# Patient Record
Sex: FEMALE | Race: WHITE | NOT HISPANIC OR LATINO | URBAN - METROPOLITAN AREA
[De-identification: names, ages, dates, MRNs, and addresses within clinical notes are randomized per-mention and may not be internally consistent; named-entity substitution may affect disease eponyms.]

---

## 2023-02-28 LAB
ALANINE AMINOTRANSFERASE (SGPT) (U/L) IN SER/PLAS: 31 U/L (ref 7–45)
ALBUMIN (G/DL) IN SER/PLAS: 3.9 G/DL (ref 3.4–5)
ALKALINE PHOSPHATASE (U/L) IN SER/PLAS: 105 U/L (ref 33–110)
ANION GAP IN SER/PLAS: 11 MMOL/L (ref 10–20)
ASPARTATE AMINOTRANSFERASE (SGOT) (U/L) IN SER/PLAS: 19 U/L (ref 9–39)
BASOPHILS (10*3/UL) IN BLOOD BY AUTOMATED COUNT: 0.03 X10E9/L (ref 0–0.1)
BASOPHILS/100 LEUKOCYTES IN BLOOD BY AUTOMATED COUNT: 0.5 % (ref 0–2)
BILIRUBIN TOTAL (MG/DL) IN SER/PLAS: 0.6 MG/DL (ref 0–1.2)
CALCIUM (MG/DL) IN SER/PLAS: 8.9 MG/DL (ref 8.6–10.3)
CARBON DIOXIDE, TOTAL (MMOL/L) IN SER/PLAS: 25 MMOL/L (ref 21–32)
CHLORIDE (MMOL/L) IN SER/PLAS: 105 MMOL/L (ref 98–107)
CHOLESTEROL (MG/DL) IN SER/PLAS: 104 MG/DL (ref 0–199)
CREATININE (MG/DL) IN SER/PLAS: 0.56 MG/DL (ref 0.5–1.05)
EOSINOPHILS (10*3/UL) IN BLOOD BY AUTOMATED COUNT: 0.16 X10E9/L (ref 0–0.7)
EOSINOPHILS/100 LEUKOCYTES IN BLOOD BY AUTOMATED COUNT: 2.7 % (ref 0–6)
ERYTHROCYTE DISTRIBUTION WIDTH (RATIO) BY AUTOMATED COUNT: 12.3 % (ref 11.5–14.5)
ERYTHROCYTE MEAN CORPUSCULAR HEMOGLOBIN CONCENTRATION (G/DL) BY AUTOMATED: 32.6 G/DL (ref 32–36)
ERYTHROCYTE MEAN CORPUSCULAR VOLUME (FL) BY AUTOMATED COUNT: 90 FL (ref 80–100)
ERYTHROCYTES (10*6/UL) IN BLOOD BY AUTOMATED COUNT: 4.18 X10E12/L (ref 4–5.2)
GFR FEMALE: >90 ML/MIN/1.73M2
GLUCOSE (MG/DL) IN SER/PLAS: 79 MG/DL (ref 74–99)
HEMATOCRIT (%) IN BLOOD BY AUTOMATED COUNT: 37.7 % (ref 36–46)
HEMOGLOBIN (G/DL) IN BLOOD: 12.3 G/DL (ref 12–16)
IMMATURE GRANULOCYTES/100 LEUKOCYTES IN BLOOD BY AUTOMATED COUNT: 0.3 % (ref 0–0.9)
LEUKOCYTES (10*3/UL) IN BLOOD BY AUTOMATED COUNT: 6 X10E9/L (ref 4.4–11.3)
LYMPHOCYTES (10*3/UL) IN BLOOD BY AUTOMATED COUNT: 1.62 X10E9/L (ref 1.2–4.8)
LYMPHOCYTES/100 LEUKOCYTES IN BLOOD BY AUTOMATED COUNT: 27.2 % (ref 13–44)
MONOCYTES (10*3/UL) IN BLOOD BY AUTOMATED COUNT: 0.46 X10E9/L (ref 0.1–1)
MONOCYTES/100 LEUKOCYTES IN BLOOD BY AUTOMATED COUNT: 7.7 % (ref 2–10)
NEUTROPHILS (10*3/UL) IN BLOOD BY AUTOMATED COUNT: 3.67 X10E9/L (ref 1.2–7.7)
NEUTROPHILS/100 LEUKOCYTES IN BLOOD BY AUTOMATED COUNT: 61.6 % (ref 40–80)
PLATELETS (10*3/UL) IN BLOOD AUTOMATED COUNT: 198 X10E9/L (ref 150–450)
POTASSIUM (MMOL/L) IN SER/PLAS: 4.1 MMOL/L (ref 3.5–5.3)
PROTEIN TOTAL: 6.5 G/DL (ref 6.4–8.2)
SODIUM (MMOL/L) IN SER/PLAS: 137 MMOL/L (ref 136–145)
THYROTROPIN (MIU/L) IN SER/PLAS BY DETECTION LIMIT <= 0.05 MIU/L: 0.01 MIU/L (ref 0.44–3.98)
THYROXINE (T4) FREE (NG/DL) IN SER/PLAS: 2.84 NG/DL (ref 0.61–1.12)
UREA NITROGEN (MG/DL) IN SER/PLAS: 13 MG/DL (ref 6–23)

## 2023-05-12 ENCOUNTER — OFFICE VISIT (OUTPATIENT)
Dept: PRIMARY CARE | Facility: CLINIC | Age: 43
End: 2023-05-12
Payer: COMMERCIAL

## 2023-05-12 VITALS
TEMPERATURE: 98.2 F | RESPIRATION RATE: 16 BRPM | HEIGHT: 63 IN | HEART RATE: 74 BPM | DIASTOLIC BLOOD PRESSURE: 70 MMHG | SYSTOLIC BLOOD PRESSURE: 122 MMHG | WEIGHT: 172.2 LBS | BODY MASS INDEX: 30.51 KG/M2

## 2023-05-12 DIAGNOSIS — E05.90 HYPERTHYROIDISM: Primary | ICD-10-CM

## 2023-05-12 DIAGNOSIS — E55.9 VITAMIN D DEFICIENCY: ICD-10-CM

## 2023-05-12 PROCEDURE — 99386 PREV VISIT NEW AGE 40-64: CPT | Performed by: NURSE PRACTITIONER

## 2023-05-12 ASSESSMENT — PROMIS GLOBAL HEALTH SCALE
RATE_QUALITY_OF_LIFE: VERY GOOD
EMOTIONAL_PROBLEMS: SOMETIMES
RATE_PHYSICAL_HEALTH: GOOD
RATE_AVERAGE_PAIN: 0
RATE_SOCIAL_SATISFACTION: VERY GOOD
CARRYOUT_SOCIAL_ACTIVITIES: VERY GOOD
RATE_AVERAGE_FATIGUE: MILD
RATE_MENTAL_HEALTH: GOOD
RATE_GENERAL_HEALTH: VERY GOOD
CARRYOUT_PHYSICAL_ACTIVITIES: COMPLETELY

## 2023-05-12 ASSESSMENT — ENCOUNTER SYMPTOMS
DIARRHEA: 0
COUGH: 0
DECREASED CONCENTRATION: 0
SORE THROAT: 0
JOINT SWELLING: 0
SINUS PRESSURE: 0
SHORTNESS OF BREATH: 0
MYALGIAS: 0
CHILLS: 0
CONSTIPATION: 0
RHINORRHEA: 0
FEVER: 0
FATIGUE: 1
EYE DISCHARGE: 0
EYE ITCHING: 0
ADENOPATHY: 0
HEADACHES: 0
DIFFICULTY URINATING: 0
VOMITING: 0
COLOR CHANGE: 0
DRY SKIN: 1
NERVOUS/ANXIOUS: 0
HAIR LOSS: 1
DYSURIA: 0
EYE PAIN: 0
WHEEZING: 0
BACK PAIN: 0
NAUSEA: 0
PALPITATIONS: 0

## 2023-05-12 NOTE — PATIENT INSTRUCTIONS
Patient to continue medications as ordered. Have labs drawn, and we will call with results when available. Follow-up in 1 year, or sooner if needed.

## 2023-05-12 NOTE — PROGRESS NOTES
"Subjective   Patient ID: Ana Bishop is a 42 y.o. female who presents for Thyroid Problem. 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     When she saw endo 10 years ago she was having major anxiety and diarrhea, and he wanted to remove her thyroid. She waited and took meds for 4-5 months and he told her she could stop it. Now her hair has been falling out more often.     Sees GYN for paps and mammograms, UTD on vaccines, labs drawn 2/28/23    Pt here to establish care and discuss thyroid issues, recent labs drawn      Thyroid Problem  Presents for initial visit. Symptoms include dry skin, fatigue and hair loss. Patient reports no anxiety, constipation, diarrhea or palpitations. The symptoms have been stable. Past treatments include levothyroxine. The treatment provided mild relief. Risk factors include family history of hypothyroidism.       Review of Systems   Constitutional:  Positive for fatigue. Negative for chills and fever.   HENT:  Negative for congestion, ear pain, rhinorrhea, sinus pressure and sore throat.    Eyes:  Negative for pain, discharge and itching.   Respiratory:  Negative for cough, shortness of breath and wheezing.    Cardiovascular:  Negative for chest pain and palpitations.   Gastrointestinal:  Negative for constipation, diarrhea, nausea and vomiting.   Genitourinary:  Negative for difficulty urinating and dysuria.   Musculoskeletal:  Negative for back pain, joint swelling and myalgias.   Skin:  Negative for color change.   Neurological:  Negative for headaches.   Hematological:  Negative for adenopathy.   Psychiatric/Behavioral:  Negative for decreased concentration. The patient is not nervous/anxious.        Objective   /70   Pulse 74   Temp 36.8 °C (98.2 °F)   Resp 16   Ht 1.588 m (5' 2.5\")   Wt 78.1 kg (172 lb 3.2 oz)   BMI 30.99 kg/m²     Physical Exam  Constitutional:       General: She is not in acute distress.     Appearance: She is not " ill-appearing.   HENT:      Head: Normocephalic and atraumatic.      Right Ear: Tympanic membrane, ear canal and external ear normal.      Left Ear: Tympanic membrane, ear canal and external ear normal.      Nose: Nose normal.      Mouth/Throat:      Mouth: Mucous membranes are moist.      Pharynx: Oropharynx is clear.   Eyes:      Conjunctiva/sclera: Conjunctivae normal.      Pupils: Pupils are equal, round, and reactive to light.   Cardiovascular:      Rate and Rhythm: Normal rate and regular rhythm.      Pulses: Normal pulses.      Heart sounds: Normal heart sounds.   Pulmonary:      Effort: Pulmonary effort is normal. No respiratory distress.      Breath sounds: Normal breath sounds.   Abdominal:      General: Bowel sounds are normal.      Palpations: Abdomen is soft.      Tenderness: There is no abdominal tenderness.   Musculoskeletal:         General: Normal range of motion.   Skin:     General: Skin is warm and dry.   Neurological:      General: No focal deficit present.      Mental Status: She is alert and oriented to person, place, and time.   Psychiatric:         Mood and Affect: Mood normal.         Behavior: Behavior normal.         Thought Content: Thought content normal.         Judgment: Judgment normal.         Assessment/Plan   Problem List Items Addressed This Visit    None  Visit Diagnoses       Hyperthyroidism    -  Primary    Vitamin D deficiency        Relevant Orders    Vitamin D 25-Hydroxy,Total          Patient Instructions   Patient to continue medications as ordered. Have labs drawn, and we will call with results when available. Follow-up in 1 year, or sooner if needed.

## 2023-07-13 LAB
ALANINE AMINOTRANSFERASE (SGPT) (U/L) IN SER/PLAS: 20 U/L (ref 7–45)
ALBUMIN (G/DL) IN SER/PLAS: 4.3 G/DL (ref 3.4–5)
ALKALINE PHOSPHATASE (U/L) IN SER/PLAS: 147 U/L (ref 33–110)
ANION GAP IN SER/PLAS: 15 MMOL/L (ref 10–20)
ASPARTATE AMINOTRANSFERASE (SGOT) (U/L) IN SER/PLAS: 17 U/L (ref 9–39)
BILIRUBIN TOTAL (MG/DL) IN SER/PLAS: 0.8 MG/DL (ref 0–1.2)
CALCIUM (MG/DL) IN SER/PLAS: 9.6 MG/DL (ref 8.6–10.6)
CARBON DIOXIDE, TOTAL (MMOL/L) IN SER/PLAS: 27 MMOL/L (ref 21–32)
CHLORIDE (MMOL/L) IN SER/PLAS: 103 MMOL/L (ref 98–107)
CREATININE (MG/DL) IN SER/PLAS: 0.67 MG/DL (ref 0.5–1.05)
GFR FEMALE: >90 ML/MIN/1.73M2
GLUCOSE (MG/DL) IN SER/PLAS: 72 MG/DL (ref 74–99)
POTASSIUM (MMOL/L) IN SER/PLAS: 5 MMOL/L (ref 3.5–5.3)
PROTEIN TOTAL: 7.1 G/DL (ref 6.4–8.2)
SODIUM (MMOL/L) IN SER/PLAS: 140 MMOL/L (ref 136–145)
THYROPEROXIDASE AB (IU/ML) IN SER/PLAS: >1000 IU/ML
THYROTROPIN (MIU/L) IN SER/PLAS BY DETECTION LIMIT <= 0.05 MIU/L: 0.22 MIU/L (ref 0.44–3.98)
THYROXINE (T4) FREE (NG/DL) IN SER/PLAS: 1.02 NG/DL (ref 0.78–1.48)
UREA NITROGEN (MG/DL) IN SER/PLAS: 11 MG/DL (ref 6–23)

## 2023-07-14 LAB — TRIIODOTHYRONINE (T3) (NG/DL) IN SER/PLAS: 125 NG/DL (ref 60–200)

## 2023-07-20 LAB — THYROID STIMULATING IMMUNOGLOBULIN: 2.4 TSI INDEX

## 2023-09-08 PROBLEM — R07.89 ATYPICAL CHEST PAIN: Status: ACTIVE | Noted: 2023-09-08

## 2023-09-08 PROBLEM — B00.9 HSV-1 INFECTION: Status: ACTIVE | Noted: 2023-09-08

## 2023-09-08 PROBLEM — J30.2 SEASONAL ALLERGIES: Status: ACTIVE | Noted: 2023-09-08

## 2023-09-08 PROBLEM — E04.9 THYROID ENLARGED: Status: ACTIVE | Noted: 2023-09-08

## 2023-09-08 PROBLEM — T83.9XXA IUD COMPLICATION (CMS-HCC): Status: ACTIVE | Noted: 2023-09-08

## 2023-09-08 PROBLEM — N39.3 STRESS INCONTINENCE: Status: ACTIVE | Noted: 2023-09-08

## 2023-09-08 PROBLEM — M22.41 CHONDROMALACIA OF RIGHT PATELLA: Status: ACTIVE | Noted: 2018-06-07

## 2023-09-08 PROBLEM — E07.9 THYROID DISORDER: Status: ACTIVE | Noted: 2023-09-08

## 2023-09-08 PROBLEM — E05.90 HYPERTHYROIDISM: Status: ACTIVE | Noted: 2023-09-08

## 2023-09-08 RX ORDER — METHIMAZOLE 5 MG/1
0.5 TABLET ORAL DAILY
COMMUNITY

## 2023-10-12 ENCOUNTER — TELEMEDICINE (OUTPATIENT)
Dept: ENDOCRINOLOGY | Facility: CLINIC | Age: 43
End: 2023-10-12
Payer: COMMERCIAL

## 2023-10-12 DIAGNOSIS — E05.90 HYPERTHYROIDISM: Primary | ICD-10-CM

## 2023-10-12 PROCEDURE — 99421 OL DIG E/M SVC 5-10 MIN: CPT | Performed by: INTERNAL MEDICINE

## 2023-10-12 NOTE — PROGRESS NOTES
Patient Discussion/Summary    we will call you with lab results      Provider Impressions    42 yo CF with history of hyperthyroidism and recent abnormal TFt suggestive for hyperthyroidism is here for initail visit was referred by her gynecologist:  1- hyperthyroidism with goiter we will check TFT and TSI and tpo and USG of thyroid no severe hyperthyroid symptoms, we wll add MMI and betablocker based on the albs, she agreed with plans. referral to [facility] as well. Albs showed TSI and tpo positive, no USG or 123 scan done, on Mmi 2.5 mg daily not taking regualrly, ordered labs, has less heavy cycles now,seeing gyn they are every month no acute issues today see endo in 2-3 months agreed with riks of MMI including agranulocytosis and liver fx.  2- History of kidney stones ordered PTH and vitamin D      Chief Complaint  Phone visit   NPV. Hyperthyroidism.      History of Present [place]58 yo CF with history of hyperthyroidism and recent abnormal labs is here for initial visit was referred by her gyn.       after having her son few years back she was having flushing and anxiety and was dx with hyperthyroidism, was started on mmi and tried for few months but was very nauseated and her TFT was normal after that and her physical was showed some abnormal TFt but never started mmi. was having some hair loss. in feb tsh was suppressed and free t4 was elevated.  , cycles are regular.had ablation and cycles are every month.   she had pregnancyx 4 , one miscarriage   no recent acne or hirsutism. some hair loss, no palpitation. slick  ahs vasectomy   discussed MMI and risk of agranulocytosis and liver fx she agreed with both discussed etiologies and indications of hyperthyroidism, check USGas well.   she had kidney stones after pregnancy check PTH and vitamin d as well no new kidney stones seeing urogyn for overactive bladder   no palpitation no weight loss, no diarrhea      Review of Systems    Constitutional: no fever,  no chills, normal appetite, no recent weight gain and no recent weight loss.   Eyes: no eye pain, no double vision and no change in vision.   ENT: no hearing loss, no dental problems, no sore throat, no change in voice and as noted in HPI.   Cardiovascular: no chest pain, no palpitations, no intermittent leg claudication and no lower extremity edema.   Respiratory: no shortness of breath, no wheezing and no cough.   Gastrointestinal: no abdominal pain, no constipation, no nausea, no diarrhea and no vomiting.   Genitourinary: no dysuria, no incontinence and no urinary hesitancy.   Genitourinary: no dysuria, no incontinence and no urinary hesitancy.   Musculoskeletal: no arthralgias, no myalgias, no muscle cramps, no joint swelling, no joint stiffness and no limb pain.   Integumentary: no change in skin color, no skin lesions, no itching, no excessive sweating and no skin wound.   Neurological: no confusion, no convulsions, no dizziness, no fainting, no tremors, no foot pain, no limb weakness and no difficulty walking.   Psychiatric: no anxiety, no depression, no personality change, no emotional problems, no suicidal ideation, no sleep disturbances and no panic attacks.   Hematologic/Lymphatic: no swollen glands in the neck, no tendency for easy bruising and no tendency for easy bleeding.   All other systems have been reviewed and are negative for complaint.      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)   · Hyperthyroidism (242.90) (E05.90)   · IUD complication (996.76) (T83.9XXA)   · Seasonal allergies (477.9) (J30.2)   · Stress incontinence, female (625.6) (N39.3)   · Thyroid disorder (246.9) (E07.9)   · Thyroid enlarged (240.9) (E04.9)   · Visit for screening mammogram (V76.12) (Z12.31)   · Well woman exam with routine gynecological exam (V72.31) (Z01.419)    Surgical History  Problems    ·  History of Ablation    Family History  Mother    · Family history of cardiac disorder (V17.49) (Z82.49)   · Family history of hypertension (V17.49) (Z82.49)   · Family history of osteoporosis (V17.81) (Z82.62)   · Family history of thyroid disease (V18.19) (Z83.49)  Father    · Family history of diabetes mellitus (V18.0) (Z83.3)    Social History  Problems    · Always uses seat belt   · Consumes alcohol (V49.89) (Z78.9)   · Former smoker (V15.82) (Z87.891)   ·     Allergies  Medication    · No Known Drug Allergies  Recorded By: Nallely Fenton; 6/26/2020 8:49:42 AM  NonMedication    · Bee sting  Recorded By: Sophia Yeager; 12/30/2022 10:11:13 AM    Current Meds    Medication Name Instruction   valACYclovir HCl - 500 MG Oral Tablet TAKE 2 TABLET TWICE DAILY by mouth for 10 days     Vitals      Physical Exam    Constitutional   General appearance: Alert and in no acute distress.   Eyes   Conjunctiva and lids: Normal. Pupils were equal in size, round, reactive to light (PERRL) with normal accommodation and extraocular movements intact (EOMI).   Ears, Nose, Mouth, and Throat   Hearing: Normal.     Oropharynx: Normal.    Neck   Neck: No neck mass was observed. Supple.    Thyroid: Not enlarged and there were no palpable thyroid nodules. goiter on exam.   Pulmonary   Respiratory effort: No respiratory distress.    Percussion of chest: Normal.     Palpation of chest: Normal.    Auscultation of lungs: Clear bilateral breath sounds.   Cardiovascular   Palpation of heart: Normal.     Auscultation of heart: Heart rate and rhythm were normal. Normal S1 and S2, no gallops, no murmurs and no pericardial rub.    Carotid pulses: Normal with no bruits.    Abdominal aorta: Normal.    Femoral pulses: Normal.     Pedal pulses: Normal.    Peripheral vascular exam: Normal.     Examination of extremities for edema and/or varicosities: No peripheral edema.   Chest   Breasts: Normal appearance; no nipple discharge.    Palpation of  breasts and axillae: No palpable mass and no axillary lymphadenopathy.   Examination of supraclavicular and dorsocervical areas: Normal. There were no supraclavicular or dorsocervical fat pads.   Abdomen   Abdomen: Normal bowel sounds, soft nontender; no abdominal mass palpated.    Liver and spleen: No hepatosplenomegaly.    Examination for hernias: No hernias.    Stool sample for occult blood: Normal.       Lymphatic   Palpation of lymph nodes in neck: No lymphadenopathy.    Palpation of lymph nodes in other areas: Normal.    Musculoskeletal   Muscle strength/tone: Normal. No proximal muscle weakness was detected.     Gait and station: Normal.    Digits and nails: No clubbing or cyanosis of the fingernails.    Inspection/palpation of joints, bones, and muscles: No joint swelling seen, normal movements of all extremities.    Range of motion: Normal.     Stability: Normal.    Neurologic -   Cranial Nerve Exam: Normal.    Reflexes: Deep tendon reflexes were normal and without delay in the return phase.   Psychiatric   Judgment and insight: Intact.    Orientation to person, place, and time: Alert and oriented x 3.    Mood and affect: Normal.

## 2023-10-13 ENCOUNTER — LAB (OUTPATIENT)
Dept: LAB | Facility: LAB | Age: 43
End: 2023-10-13
Payer: COMMERCIAL

## 2023-10-13 ENCOUNTER — TELEPHONE (OUTPATIENT)
Dept: INTERNAL MEDICINE | Facility: HOSPITAL | Age: 43
End: 2023-10-13

## 2023-10-13 DIAGNOSIS — E55.9 VITAMIN D DEFICIENCY: ICD-10-CM

## 2023-10-13 DIAGNOSIS — E55.9 VITAMIN D DEFICIENCY: Primary | ICD-10-CM

## 2023-10-13 DIAGNOSIS — R79.89 LFT ELEVATION: Primary | ICD-10-CM

## 2023-10-13 DIAGNOSIS — E05.90 HYPERTHYROIDISM: ICD-10-CM

## 2023-10-13 LAB
25(OH)D3 SERPL-MCNC: 18 NG/ML (ref 30–100)
ALBUMIN SERPL BCP-MCNC: 4.1 G/DL (ref 3.4–5)
ALP SERPL-CCNC: 119 U/L (ref 33–110)
ALT SERPL W P-5'-P-CCNC: 13 U/L (ref 7–45)
ANION GAP SERPL CALC-SCNC: 13 MMOL/L (ref 10–20)
AST SERPL W P-5'-P-CCNC: 13 U/L (ref 9–39)
BILIRUB SERPL-MCNC: 0.8 MG/DL (ref 0–1.2)
BUN SERPL-MCNC: 12 MG/DL (ref 6–23)
CALCIUM SERPL-MCNC: 9 MG/DL (ref 8.6–10.3)
CHLORIDE SERPL-SCNC: 105 MMOL/L (ref 98–107)
CO2 SERPL-SCNC: 24 MMOL/L (ref 21–32)
CREAT SERPL-MCNC: 0.82 MG/DL (ref 0.5–1.05)
GFR SERPL CREATININE-BSD FRML MDRD: >90 ML/MIN/1.73M*2
GLUCOSE SERPL-MCNC: 80 MG/DL (ref 74–99)
POTASSIUM SERPL-SCNC: 4.6 MMOL/L (ref 3.5–5.3)
PROLACTIN SERPL-MCNC: 11.9 UG/L (ref 3–20)
PROT SERPL-MCNC: 6.7 G/DL (ref 6.4–8.2)
SODIUM SERPL-SCNC: 137 MMOL/L (ref 136–145)
T3 SERPL-MCNC: 108 NG/DL (ref 60–200)
T4 FREE SERPL-MCNC: 0.88 NG/DL (ref 0.61–1.12)
TSH SERPL-ACNC: 1.62 MIU/L (ref 0.44–3.98)

## 2023-10-13 PROCEDURE — 84443 ASSAY THYROID STIM HORMONE: CPT

## 2023-10-13 PROCEDURE — 82306 VITAMIN D 25 HYDROXY: CPT

## 2023-10-13 PROCEDURE — 84480 ASSAY TRIIODOTHYRONINE (T3): CPT

## 2023-10-13 PROCEDURE — 36415 COLL VENOUS BLD VENIPUNCTURE: CPT

## 2023-10-13 PROCEDURE — 84439 ASSAY OF FREE THYROXINE: CPT

## 2023-10-13 PROCEDURE — 80053 COMPREHEN METABOLIC PANEL: CPT

## 2023-10-13 PROCEDURE — 84146 ASSAY OF PROLACTIN: CPT

## 2023-10-13 RX ORDER — FOLIC ACID/MULTIVIT,IRON,MINER 0.4MG-18MG
1000 TABLET ORAL ONCE
Status: SHIPPED | OUTPATIENT
Start: 2023-10-13

## 2023-10-13 NOTE — TELEPHONE ENCOUNTER
Called her informed results Tft normal taking MMI 2.5 mg 2-4 times a week we asked her to taper it off, we will hold offI 123 scan and also USG for now she will see my colleagues in 2-3 months

## 2023-10-15 RX ORDER — ERGOCALCIFEROL 1.25 MG/1
50000 CAPSULE ORAL
Qty: 12 CAPSULE | Refills: 0 | Status: SHIPPED | OUTPATIENT
Start: 2023-10-15 | End: 2024-01-07

## 2023-10-26 ENCOUNTER — APPOINTMENT (OUTPATIENT)
Dept: RADIOLOGY | Facility: HOSPITAL | Age: 43
End: 2023-10-26
Payer: COMMERCIAL

## 2024-01-03 ENCOUNTER — APPOINTMENT (OUTPATIENT)
Dept: OBSTETRICS AND GYNECOLOGY | Facility: CLINIC | Age: 44
End: 2024-01-03
Payer: COMMERCIAL

## 2024-02-21 ENCOUNTER — APPOINTMENT (OUTPATIENT)
Dept: OBSTETRICS AND GYNECOLOGY | Facility: CLINIC | Age: 44
End: 2024-02-21
Payer: COMMERCIAL

## 2024-03-13 ENCOUNTER — APPOINTMENT (OUTPATIENT)
Dept: OBSTETRICS AND GYNECOLOGY | Facility: CLINIC | Age: 44
End: 2024-03-13
Payer: COMMERCIAL

## 2024-03-20 ENCOUNTER — APPOINTMENT (OUTPATIENT)
Dept: RADIOLOGY | Facility: CLINIC | Age: 44
End: 2024-03-20
Payer: COMMERCIAL

## 2024-03-20 ENCOUNTER — OFFICE VISIT (OUTPATIENT)
Dept: OBSTETRICS AND GYNECOLOGY | Facility: CLINIC | Age: 44
End: 2024-03-20
Payer: COMMERCIAL

## 2024-03-20 VITALS
WEIGHT: 171.38 LBS | DIASTOLIC BLOOD PRESSURE: 70 MMHG | BODY MASS INDEX: 30.37 KG/M2 | HEIGHT: 63 IN | SYSTOLIC BLOOD PRESSURE: 120 MMHG

## 2024-03-20 DIAGNOSIS — Z12.31 SCREENING MAMMOGRAM FOR BREAST CANCER: ICD-10-CM

## 2024-03-20 DIAGNOSIS — Z01.419 VISIT FOR GYNECOLOGIC EXAMINATION: Primary | ICD-10-CM

## 2024-03-20 DIAGNOSIS — B00.9 HSV-1 INFECTION: ICD-10-CM

## 2024-03-20 DIAGNOSIS — Z12.31 BREAST CANCER SCREENING BY MAMMOGRAM: ICD-10-CM

## 2024-03-20 PROCEDURE — 99396 PREV VISIT EST AGE 40-64: CPT | Performed by: ADVANCED PRACTICE MIDWIFE

## 2024-03-20 RX ORDER — VALACYCLOVIR HYDROCHLORIDE 500 MG/1
500 TABLET, FILM COATED ORAL 2 TIMES DAILY
Qty: 6 TABLET | Refills: 0 | Status: SHIPPED | OUTPATIENT
Start: 2024-03-20 | End: 2024-03-23

## 2024-03-20 ASSESSMENT — ENCOUNTER SYMPTOMS
GASTROINTESTINAL NEGATIVE: 0
HEMATOLOGIC/LYMPHATIC NEGATIVE: 0
RESPIRATORY NEGATIVE: 0
MUSCULOSKELETAL NEGATIVE: 0
PSYCHIATRIC NEGATIVE: 0
ALLERGIC/IMMUNOLOGIC NEGATIVE: 0
ENDOCRINE NEGATIVE: 0
CARDIOVASCULAR NEGATIVE: 0
EYES NEGATIVE: 0
NEUROLOGICAL NEGATIVE: 0
CONSTITUTIONAL NEGATIVE: 0

## 2024-03-20 NOTE — PROGRESS NOTES
"Billie Bishop \"Ana\" is a 43 y.o. female who is here for an annual gyn exam.     Concerns for this visit: None    Periods are irregular, lasting  light spotting 3  days. Feels January period came early. S/p ablation. Dysmenorrhea:none. Cyclic symptoms include irritability. No intermenstrual bleeding, spotting, or discharge.  Current contraception: vasectomy  PAP: 2/27/20 NML HPV -  History of abnormal Pap smear: no  Family history of uterine or ovarian cancer: no  Mammogram: 12/15/22 NML   History of abnormal mammogram: no  Family history of breast cancer: no  Medical hx: Hoshimotos not requiring medication    Review of Systems   Unable to perform ROS: Other (Loss of Hair)   All other systems reviewed and are negative.      Objective     Physical Exam  Constitutional:       General: She is not in acute distress.  Cardiovascular:      Rate and Rhythm: Normal rate and regular rhythm.      Heart sounds: Normal heart sounds.   Pulmonary:      Effort: Pulmonary effort is normal.      Breath sounds: Normal breath sounds.   Chest:      Chest wall: No deformity, swelling or tenderness.   Breasts:     Right: Normal.      Left: Normal.   Abdominal:      Palpations: Abdomen is soft. There is no mass.      Tenderness: There is no abdominal tenderness.   Genitourinary:     General: Normal vulva.      Vagina: No vaginal discharge, erythema, tenderness, bleeding or lesions.      Cervix: No cervical motion tenderness, discharge, friability, lesion or cervical bleeding.      Uterus: Normal. Not enlarged, not fixed and not tender.       Adnexa: Right adnexa normal and left adnexa normal.        Right: No mass, tenderness or fullness.          Left: No mass, tenderness or fullness.        Rectum: Normal. No anal fissure or external hemorrhoid.   Lymphadenopathy:      Upper Body:      Right upper body: No supraclavicular or axillary adenopathy.      Left upper body: No supraclavicular or axillary adenopathy. "   Neurological:      Mental Status: She is alert.          Assessment/Plan: 43 y.o. yo woman for annual GYN exam    Maria Guadalupe was seen today for well women visit.  Diagnoses and all orders for this visit:  Breast cancer screening by mammogram       Points of Discussion:    Abnormal bleeding parameters that would prompt a call  Current PAP guidelines, PAP due 2025  Self breast exam encouraged - mammogram screening by current guidelines  Diet and exercise reviewed  Routine follow up with PCP for health maintenance examination encouraged   Follow up results by portal unless otherwise indicated  F/U 1 year or as needed

## 2024-04-02 ENCOUNTER — APPOINTMENT (OUTPATIENT)
Dept: RADIOLOGY | Facility: CLINIC | Age: 44
End: 2024-04-02
Payer: COMMERCIAL

## 2024-04-02 DIAGNOSIS — Z12.31 SCREENING MAMMOGRAM FOR BREAST CANCER: ICD-10-CM

## 2024-05-29 ENCOUNTER — HOSPITAL ENCOUNTER (OUTPATIENT)
Dept: RADIOLOGY | Facility: CLINIC | Age: 44
Discharge: HOME | End: 2024-05-29
Payer: COMMERCIAL

## 2024-05-29 VITALS — HEIGHT: 63 IN | WEIGHT: 170 LBS | BODY MASS INDEX: 30.12 KG/M2

## 2024-05-29 DIAGNOSIS — Z12.31 SCREENING MAMMOGRAM FOR BREAST CANCER: ICD-10-CM

## 2024-05-29 PROCEDURE — 77067 SCR MAMMO BI INCL CAD: CPT | Performed by: RADIOLOGY

## 2024-05-29 PROCEDURE — 77063 BREAST TOMOSYNTHESIS BI: CPT | Performed by: RADIOLOGY

## 2024-05-29 PROCEDURE — 77067 SCR MAMMO BI INCL CAD: CPT

## 2025-03-25 ENCOUNTER — APPOINTMENT (OUTPATIENT)
Facility: CLINIC | Age: 45
End: 2025-03-25
Payer: COMMERCIAL

## 2025-04-01 ENCOUNTER — APPOINTMENT (OUTPATIENT)
Dept: OBSTETRICS AND GYNECOLOGY | Facility: CLINIC | Age: 45
End: 2025-04-01
Payer: COMMERCIAL

## 2025-04-01 VITALS
WEIGHT: 170.2 LBS | SYSTOLIC BLOOD PRESSURE: 121 MMHG | DIASTOLIC BLOOD PRESSURE: 77 MMHG | BODY MASS INDEX: 30.16 KG/M2 | HEIGHT: 63 IN

## 2025-04-01 DIAGNOSIS — Z01.419 VISIT FOR GYNECOLOGIC EXAMINATION: Primary | ICD-10-CM

## 2025-04-01 DIAGNOSIS — F41.9 ANXIETY: ICD-10-CM

## 2025-04-01 PROCEDURE — 99396 PREV VISIT EST AGE 40-64: CPT | Performed by: ADVANCED PRACTICE MIDWIFE

## 2025-04-01 PROCEDURE — 87624 HPV HI-RISK TYP POOLED RSLT: CPT

## 2025-04-01 PROCEDURE — 3008F BODY MASS INDEX DOCD: CPT | Performed by: ADVANCED PRACTICE MIDWIFE

## 2025-04-01 RX ORDER — CITALOPRAM 20 MG/1
20 TABLET, FILM COATED ORAL DAILY
Qty: 30 TABLET | Refills: 2 | Status: SHIPPED | OUTPATIENT
Start: 2025-04-01 | End: 2025-06-30

## 2025-04-01 NOTE — PROGRESS NOTES
"Billie Bishop \"Bernice" is a 44 y.o. female  who is here for a routine annual gynecology exam.  Concerns for this visit: anxiety frequently    S/p ablation   LMP: 2025, becoming heavier and more frequent but then will skip a month  Current contraception: none  Menses: abnormal   Last pap: 2020 nml HPV-  History of abnormal Pap smear: no  Due for pap: yes -    Family history of uterine or ovarian cancer: no  Family history of breast cancer: yes - paternal side  Last mammogram: 2024, orders in     Review of Systems  Patient intake reviewed    Objective   /77 (BP Location: Right arm, Patient Position: Sitting)   Ht 1.588 m (5' 2.5\")   Wt 77.2 kg (170 lb 3.2 oz)   LMP 2025   BMI 30.63 kg/m²   Physical Exam  Constitutional:       General: She is not in acute distress.  Cardiovascular:      Rate and Rhythm: Normal rate and regular rhythm.      Heart sounds: Normal heart sounds.   Pulmonary:      Effort: Pulmonary effort is normal.      Breath sounds: Normal breath sounds.   Chest:      Chest wall: No deformity, swelling or tenderness.   Breasts:     Right: Normal.      Left: Normal.   Abdominal:      Palpations: Abdomen is soft. There is no mass.      Tenderness: There is no abdominal tenderness.   Genitourinary:     General: Normal vulva.      Vagina: No vaginal discharge, erythema, tenderness, bleeding or lesions.      Cervix: Friability present. No cervical motion tenderness, discharge, lesion or cervical bleeding.      Uterus: Normal. Not enlarged, not fixed and not tender.       Adnexa: Right adnexa normal and left adnexa normal.        Right: No mass, tenderness or fullness.          Left: No mass, tenderness or fullness.        Rectum: Normal. No anal fissure or external hemorrhoid.   Lymphadenopathy:      Upper Body:      Right upper body: No supraclavicular or axillary adenopathy.      Left upper body: No supraclavicular or axillary adenopathy. " "  Neurological:      Mental Status: She is alert.          Assessment/Plan: 44 y.o. yo woman for annual GYN exam    Maria Guadalupe \"Ana\" was seen today for annual exam.  Diagnoses and all orders for this visit:  Visit for gynecologic examination  -     THINPREP PAP TEST (>30)  Anxiety  -     citalopram (CeleXA) 20 mg tablet; Take 1 tablet (20 mg) by mouth once daily.       Points of Discussion:  Anxiety can be related to perimenopause, continue counseling, start Celexa 10mg daily x 1 week then increase to 20mg daily  S/p Ablation, non bothersome bleeding at this time;Abnormal bleeding parameters that would prompt a call -- if consistently twice monthly or >1 pad/hr flow contact office  Current PAP guidelines (ASCCP)  Self breast exam encouraged. Mammogram screening by current guidelines  Diet and exercise  Routine follow up with PCP for health maintenance examination encouraged   Follow up results by portal unless otherwise indicated    F/U 1 year or as needed   "

## 2025-04-29 ENCOUNTER — APPOINTMENT (OUTPATIENT)
Dept: PRIMARY CARE | Facility: CLINIC | Age: 45
End: 2025-04-29
Payer: COMMERCIAL

## 2025-06-17 ENCOUNTER — HOSPITAL ENCOUNTER (OUTPATIENT)
Dept: RADIOLOGY | Facility: CLINIC | Age: 45
Discharge: HOME | End: 2025-06-17
Payer: COMMERCIAL

## 2025-06-17 DIAGNOSIS — Z12.31 SCREENING MAMMOGRAM FOR BREAST CANCER: ICD-10-CM

## 2025-06-17 PROCEDURE — 77067 SCR MAMMO BI INCL CAD: CPT | Performed by: STUDENT IN AN ORGANIZED HEALTH CARE EDUCATION/TRAINING PROGRAM

## 2025-06-17 PROCEDURE — 77067 SCR MAMMO BI INCL CAD: CPT

## 2025-06-17 PROCEDURE — 77063 BREAST TOMOSYNTHESIS BI: CPT | Performed by: STUDENT IN AN ORGANIZED HEALTH CARE EDUCATION/TRAINING PROGRAM

## 2025-06-23 ASSESSMENT — PROMIS GLOBAL HEALTH SCALE
RATE_AVERAGE_PAIN: 0
RATE_QUALITY_OF_LIFE: GOOD
CARRYOUT_PHYSICAL_ACTIVITIES: COMPLETELY
RATE_PHYSICAL_HEALTH: GOOD
CARRYOUT_SOCIAL_ACTIVITIES: VERY GOOD
EMOTIONAL_PROBLEMS: SOMETIMES
RATE_MENTAL_HEALTH: FAIR
RATE_SOCIAL_SATISFACTION: VERY GOOD
RATE_AVERAGE_FATIGUE: MODERATE
RATE_GENERAL_HEALTH: GOOD

## 2025-06-24 ENCOUNTER — APPOINTMENT (OUTPATIENT)
Dept: PRIMARY CARE | Facility: CLINIC | Age: 45
End: 2025-06-24
Payer: COMMERCIAL

## 2025-06-24 VITALS
WEIGHT: 169.8 LBS | TEMPERATURE: 98.2 F | BODY MASS INDEX: 30.09 KG/M2 | HEIGHT: 63 IN | RESPIRATION RATE: 16 BRPM | HEART RATE: 76 BPM | DIASTOLIC BLOOD PRESSURE: 77 MMHG | SYSTOLIC BLOOD PRESSURE: 109 MMHG | OXYGEN SATURATION: 98 %

## 2025-06-24 DIAGNOSIS — N39.3 STRESS INCONTINENCE, FEMALE: ICD-10-CM

## 2025-06-24 DIAGNOSIS — R53.83 OTHER FATIGUE: ICD-10-CM

## 2025-06-24 DIAGNOSIS — F32.A ANXIETY AND DEPRESSION: ICD-10-CM

## 2025-06-24 DIAGNOSIS — N95.1 PERIMENOPAUSE: ICD-10-CM

## 2025-06-24 DIAGNOSIS — Z00.00 PHYSICAL EXAM, ANNUAL: Primary | ICD-10-CM

## 2025-06-24 DIAGNOSIS — F41.9 ANXIETY AND DEPRESSION: ICD-10-CM

## 2025-06-24 PROCEDURE — 3008F BODY MASS INDEX DOCD: CPT | Performed by: NURSE PRACTITIONER

## 2025-06-24 PROCEDURE — 99396 PREV VISIT EST AGE 40-64: CPT | Performed by: NURSE PRACTITIONER

## 2025-06-24 RX ORDER — VENLAFAXINE HYDROCHLORIDE 75 MG/1
75 CAPSULE, EXTENDED RELEASE ORAL DAILY
Qty: 30 CAPSULE | Refills: 1 | Status: SHIPPED | OUTPATIENT
Start: 2025-06-24 | End: 2025-08-23

## 2025-06-24 ASSESSMENT — PATIENT HEALTH QUESTIONNAIRE - PHQ9
1. LITTLE INTEREST OR PLEASURE IN DOING THINGS: NOT AT ALL
SUM OF ALL RESPONSES TO PHQ9 QUESTIONS 1 AND 2: 0
2. FEELING DOWN, DEPRESSED OR HOPELESS: NOT AT ALL

## 2025-06-24 NOTE — PATIENT INSTRUCTIONS
Patient to stop taking celexa, and start taking effexor daily as ordered. Discussed possibility of HRT if no improvement with effexor. Have fasting labs drawn, and we will call with results when available. Follow-up with me or GYN in 3-6 months,, or sooner if needed. Call the office if any problems or concerns in the meantime.     Yara Ariza (urogynecology)

## 2025-06-24 NOTE — PROGRESS NOTES
"Subjective   Patient ID: Maria Guadalupe Bishop \"Ana\" is a 44 y.o. female who presents for Annual Exam.    Patient presents for her annual exam.  Mammogram: 2025  EC2018    Well Adult Physical   Patient here for a comprehensive physical exam.The patient reports problems - Patient states she was wondering if she is due for basic blood work panel.    Do you take any herbs or supplements that were not prescribed by a doctor? no   Are you taking calcium supplements? no   Are you taking aspirin daily? no     History:  LMP: No LMP recorded. Patient is perimenopausal.  Last pap date: 2025  Abnormal pap? no  : 3  Para: 3    She has been seeing a therapist for a long time, has been taking celexa for the past 10 weeks with minimal improvement in anxiety and hot flashes.         Review of Systems   Constitutional:  Positive for fatigue. Negative for chills and fever.   HENT:  Negative for congestion, ear pain, rhinorrhea, sinus pressure and sore throat.    Eyes:  Negative for pain, discharge and itching.   Respiratory:  Negative for cough, shortness of breath and wheezing.    Cardiovascular:  Negative for chest pain and palpitations.   Gastrointestinal:  Negative for constipation, diarrhea, nausea and vomiting.   Genitourinary:  Negative for difficulty urinating and dysuria.   Musculoskeletal:  Negative for back pain, joint swelling and myalgias.   Skin:  Negative for color change.   Neurological:  Negative for headaches.   Hematological:  Negative for adenopathy.   Psychiatric/Behavioral:  Negative for decreased concentration. The patient is nervous/anxious.    All other systems reviewed and are negative.      Objective   Physical Exam  Constitutional:       General: She is not in acute distress.     Appearance: She is not ill-appearing.   HENT:      Head: Normocephalic and atraumatic.      Right Ear: Tympanic membrane, ear canal and external ear normal.      Left Ear: Tympanic membrane, ear canal " and external ear normal.      Nose: Nose normal.      Mouth/Throat:      Mouth: Mucous membranes are moist.      Pharynx: Oropharynx is clear.   Eyes:      Conjunctiva/sclera: Conjunctivae normal.      Pupils: Pupils are equal, round, and reactive to light.   Cardiovascular:      Rate and Rhythm: Normal rate and regular rhythm.      Pulses: Normal pulses.      Heart sounds: Normal heart sounds.   Pulmonary:      Effort: Pulmonary effort is normal. No respiratory distress.      Breath sounds: Normal breath sounds.   Abdominal:      General: Bowel sounds are normal.      Palpations: Abdomen is soft.      Tenderness: There is no abdominal tenderness.   Musculoskeletal:         General: Normal range of motion.   Skin:     General: Skin is warm and dry.   Neurological:      General: No focal deficit present.      Mental Status: She is alert and oriented to person, place, and time.   Psychiatric:         Mood and Affect: Mood normal.         Behavior: Behavior normal.         Thought Content: Thought content normal.         Judgment: Judgment normal.         Assessment/Plan   Problem List Items Addressed This Visit       Stress incontinence, female    Relevant Orders    Referral to Urogynecology     Other Visit Diagnoses         Physical exam, annual    -  Primary      Other fatigue        Relevant Orders    CBC and Auto Differential    Comprehensive Metabolic Panel    Lipid Panel      Perimenopause          Anxiety and depression        Relevant Medications    venlafaxine XR (Effexor-XR) 75 mg 24 hr capsule          Patient Instructions   Patient to stop taking celexa, and start taking effexor daily as ordered. Discussed possibility of HRT if no improvement with effexor. Have fasting labs drawn, and we will call with results when available. Follow-up with me or GYN in 3-6 months,, or sooner if needed. Call the office if any problems or concerns in the meantime.     Yara Ariza (urogynecology)

## 2025-07-02 ASSESSMENT — ENCOUNTER SYMPTOMS
SORE THROAT: 0
NERVOUS/ANXIOUS: 1
BACK PAIN: 0
RHINORRHEA: 0
SINUS PRESSURE: 0
DIARRHEA: 0
DYSURIA: 0
WHEEZING: 0
DECREASED CONCENTRATION: 0
CONSTIPATION: 0
JOINT SWELLING: 0
HEADACHES: 0
COLOR CHANGE: 0
MYALGIAS: 0
EYE PAIN: 0
CHILLS: 0
FEVER: 0
EYE ITCHING: 0
NAUSEA: 0
DIFFICULTY URINATING: 0
COUGH: 0
ADENOPATHY: 0
EYE DISCHARGE: 0
FATIGUE: 1
PALPITATIONS: 0
SHORTNESS OF BREATH: 0
VOMITING: 0

## 2025-07-29 LAB
ALBUMIN SERPL-MCNC: 4.3 G/DL (ref 3.6–5.1)
ALP SERPL-CCNC: 140 U/L (ref 31–125)
ALT SERPL-CCNC: 12 U/L (ref 6–29)
ANION GAP SERPL CALCULATED.4IONS-SCNC: 6 MMOL/L (CALC) (ref 7–17)
AST SERPL-CCNC: 13 U/L (ref 10–30)
BASOPHILS # BLD AUTO: 38 CELLS/UL (ref 0–200)
BASOPHILS NFR BLD AUTO: 0.6 %
BILIRUB SERPL-MCNC: 1 MG/DL (ref 0.2–1.2)
BUN SERPL-MCNC: 11 MG/DL (ref 7–25)
CALCIUM SERPL-MCNC: 9.1 MG/DL (ref 8.6–10.2)
CHLORIDE SERPL-SCNC: 104 MMOL/L (ref 98–110)
CHOLEST SERPL-MCNC: 148 MG/DL
CHOLEST/HDLC SERPL: 3 (CALC)
CO2 SERPL-SCNC: 28 MMOL/L (ref 20–32)
CREAT SERPL-MCNC: 0.69 MG/DL (ref 0.5–0.99)
EGFRCR SERPLBLD CKD-EPI 2021: 110 ML/MIN/1.73M2
EOSINOPHIL # BLD AUTO: 256 CELLS/UL (ref 15–500)
EOSINOPHIL NFR BLD AUTO: 4 %
ERYTHROCYTE [DISTWIDTH] IN BLOOD BY AUTOMATED COUNT: 13 % (ref 11–15)
GLUCOSE SERPL-MCNC: 83 MG/DL (ref 65–99)
HCT VFR BLD AUTO: 41.7 % (ref 35–45)
HDLC SERPL-MCNC: 49 MG/DL
HGB BLD-MCNC: 13.6 G/DL (ref 11.7–15.5)
LDLC SERPL CALC-MCNC: 82 MG/DL (CALC)
LYMPHOCYTES # BLD AUTO: 1773 CELLS/UL (ref 850–3900)
LYMPHOCYTES NFR BLD AUTO: 27.7 %
MCH RBC QN AUTO: 29.4 PG (ref 27–33)
MCHC RBC AUTO-ENTMCNC: 32.6 G/DL (ref 32–36)
MCV RBC AUTO: 90.3 FL (ref 80–100)
MONOCYTES # BLD AUTO: 416 CELLS/UL (ref 200–950)
MONOCYTES NFR BLD AUTO: 6.5 %
NEUTROPHILS # BLD AUTO: 3917 CELLS/UL (ref 1500–7800)
NEUTROPHILS NFR BLD AUTO: 61.2 %
NONHDLC SERPL-MCNC: 99 MG/DL (CALC)
PLATELET # BLD AUTO: 272 THOUSAND/UL (ref 140–400)
PMV BLD REES-ECKER: 10.3 FL (ref 7.5–12.5)
POTASSIUM SERPL-SCNC: 4.5 MMOL/L (ref 3.5–5.3)
PROT SERPL-MCNC: 6.8 G/DL (ref 6.1–8.1)
RBC # BLD AUTO: 4.62 MILLION/UL (ref 3.8–5.1)
SODIUM SERPL-SCNC: 138 MMOL/L (ref 135–146)
TRIGL SERPL-MCNC: 85 MG/DL
WBC # BLD AUTO: 6.4 THOUSAND/UL (ref 3.8–10.8)

## 2025-07-31 PROBLEM — R74.8 ELEVATED ALKALINE PHOSPHATASE LEVEL: Status: ACTIVE | Noted: 2025-07-31

## 2025-08-05 ENCOUNTER — APPOINTMENT (OUTPATIENT)
Dept: RADIOLOGY | Facility: CLINIC | Age: 45
End: 2025-08-05
Payer: COMMERCIAL

## 2025-08-07 ENCOUNTER — APPOINTMENT (OUTPATIENT)
Dept: OBSTETRICS AND GYNECOLOGY | Facility: CLINIC | Age: 45
End: 2025-08-07
Payer: COMMERCIAL

## 2025-08-07 ENCOUNTER — HOSPITAL ENCOUNTER (OUTPATIENT)
Dept: RADIOLOGY | Facility: CLINIC | Age: 45
Discharge: HOME | End: 2025-08-07
Payer: COMMERCIAL

## 2025-08-07 VITALS — BODY MASS INDEX: 29.95 KG/M2 | WEIGHT: 169 LBS | HEIGHT: 63 IN

## 2025-08-07 DIAGNOSIS — N95.1 PERIMENOPAUSAL SYMPTOMS: Primary | ICD-10-CM

## 2025-08-07 DIAGNOSIS — F41.9 ANXIETY: ICD-10-CM

## 2025-08-07 DIAGNOSIS — R74.8 ELEVATED ALKALINE PHOSPHATASE LEVEL: ICD-10-CM

## 2025-08-07 PROCEDURE — 99214 OFFICE O/P EST MOD 30 MIN: CPT | Performed by: ADVANCED PRACTICE MIDWIFE

## 2025-08-07 PROCEDURE — 76705 ECHO EXAM OF ABDOMEN: CPT

## 2025-08-07 PROCEDURE — 76705 ECHO EXAM OF ABDOMEN: CPT | Performed by: RADIOLOGY

## 2025-08-07 PROCEDURE — 3008F BODY MASS INDEX DOCD: CPT | Performed by: ADVANCED PRACTICE MIDWIFE

## 2025-08-07 RX ORDER — ESTRADIOL 0.04 MG/D
1 PATCH TRANSDERMAL WEEKLY
Qty: 4 PATCH | Refills: 2 | Status: SHIPPED | OUTPATIENT
Start: 2025-08-07

## 2025-08-07 RX ORDER — PROGESTERONE 100 MG/1
100 CAPSULE ORAL NIGHTLY
Qty: 90 CAPSULE | Refills: 3 | Status: SHIPPED | OUTPATIENT
Start: 2025-08-07 | End: 2026-08-07

## 2025-08-07 NOTE — PROGRESS NOTES
"Assessment/Plan   {There are no diagnoses linked to this encounter. (Refresh or delete this SmartLink)}  Follow up per results    Subjective   Maria Guadalupe Bishop \"Ana\" is a 45 y.o. female who presents for *** discuss anxiety, perimenopause, MARCIA  Options instead of Effexor, she doesn't like  Effexor causing sleepiness and felt blah, stopped 25 and energy improved some  Has been emotional since stopping, falls asleep but cannot stay asleep, decreased motivation  Continues to have light periods    Menstrual History:  OB History          3    Para   3    Term   3            AB        Living             SAB        IAB        Ectopic        Multiple        Live Births                    Menarche age: ***  No LMP recorded. Patient is perimenopausal.         ROS as in HPI    Objective   There were no vitals taken for this visit.    Physical Exam      " time communications between the patient (at the originating site) and provider (at the distant site) was utilized to provide this telehealth service.   Verbal consent was requested and obtained from Maria Guadalupe Bishop on this date, 8/7/25 for a telehealth visit and the patient's location was confirmed at the time of the visit.

## 2025-09-18 ENCOUNTER — APPOINTMENT (OUTPATIENT)
Dept: OBSTETRICS AND GYNECOLOGY | Facility: CLINIC | Age: 45
End: 2025-09-18
Payer: COMMERCIAL

## 2025-09-25 ENCOUNTER — APPOINTMENT (OUTPATIENT)
Dept: UROLOGY | Facility: CLINIC | Age: 45
End: 2025-09-25
Payer: COMMERCIAL